# Patient Record
Sex: FEMALE | Race: WHITE | NOT HISPANIC OR LATINO | Employment: UNEMPLOYED | URBAN - METROPOLITAN AREA
[De-identification: names, ages, dates, MRNs, and addresses within clinical notes are randomized per-mention and may not be internally consistent; named-entity substitution may affect disease eponyms.]

---

## 2017-03-29 DIAGNOSIS — Z12.31 ENCOUNTER FOR SCREENING MAMMOGRAM FOR MALIGNANT NEOPLASM OF BREAST: ICD-10-CM

## 2017-03-30 ENCOUNTER — ALLSCRIPTS OFFICE VISIT (OUTPATIENT)
Dept: OTHER | Facility: OTHER | Age: 47
End: 2017-03-30

## 2018-01-13 VITALS — WEIGHT: 173.56 LBS | SYSTOLIC BLOOD PRESSURE: 110 MMHG | DIASTOLIC BLOOD PRESSURE: 84 MMHG | BODY MASS INDEX: 22.9 KG/M2

## 2018-01-13 NOTE — RESULT NOTES
Verified Results  ([facility]) PapIG, HPV, rfx 16/18 [id number] 12:00AM Amanda Fuchs   No  of containers  Rosella Goldmann 01 CYTYC Thin Prep Vial     Test Name Result Flag Reference   DIAGNOSIS: Comment     NEGATIVE FOR INTRAEPITHELIAL LESION AND MALIGNANCY  THE CYTOLOGY PROCESSING WAS PERFORMED AT THE [facility] LOCATED AT  [facility], [address] [place] [address], [address] [place] [id number]  Specimen adequacy: Comment     Satisfactory for evaluation  Endocervical and/or squamous metaplastic  cells (endocervical component) are present  Clinician provided ICD10: Comment     [id number]   Performed by: Odalis Lomeli, Cytotechnologist (ASCP)     Rosella Goldmann Note: Comment     The Pap smear is a screening test designed to aid in the detection of  premalignant and malignant conditions of the uterine cervix  It is not a  diagnostic procedure and should not be used as the sole means of detecting  cervical cancer  Both false-positive and false-negative reports do occur  Test Methodology: Comment     This liquid based ThinPrep(R) pap test was screened with the  use of an image guided system  HPV, high-risk Negative  Negative   This high-risk HPV test detects thirteen high-risk types  (16/18/31/33/35/39/45/51/52/56/58/59/68) without differentiation

## 2018-01-14 NOTE — RESULT NOTES
Verified Results  ([facility]) Genital Culture, Routine 21Jan2016 12:00AM Jaswinder Cruz     Test Name Result Flag Reference   Genital Culture, Routine Final report     Result 1 Comment     Routine genital sandee

## 2018-04-03 ENCOUNTER — ANNUAL EXAM (OUTPATIENT)
Dept: OBGYN CLINIC | Facility: MEDICAL CENTER | Age: 48
End: 2018-04-03

## 2018-04-03 VITALS — DIASTOLIC BLOOD PRESSURE: 88 MMHG | BODY MASS INDEX: 24.14 KG/M2 | WEIGHT: 183 LBS | SYSTOLIC BLOOD PRESSURE: 142 MMHG

## 2018-04-03 DIAGNOSIS — Z12.39 SCREENING FOR MALIGNANT NEOPLASM OF BREAST: ICD-10-CM

## 2018-04-03 DIAGNOSIS — Z01.419 ENCOUNTER FOR WELL WOMAN EXAM WITH ROUTINE GYNECOLOGICAL EXAM: Primary | ICD-10-CM

## 2018-04-03 PROCEDURE — 99396 PREV VISIT EST AGE 40-64: CPT | Performed by: OBSTETRICS & GYNECOLOGY

## 2018-04-03 NOTE — PROGRESS NOTES
ASSESSMENT & PLAN: Paula Alcaraz is a 41 y o  [id number] with normal gynecologic exam     1   Routine well woman exam done today  2  Pap and HPV:  The patient's [unfilled]  Pap and cotesting was not done today  Current ASCCP Guidelines reviewed  Done  - next due   3  Mammogram ordered  4  The following were reviewed in today's visit: breast self exam and mammography screening ordered      CC:  Annual Gynecologic Examination    HPI: Paula Alcaraz is a 41 y o  [id number] who presents for annual gynecologic examination  She has the following concerns:  No gyn complaints    Health Maintenance:    She wears her seatbelt routinely  She does not perform regular monthly self breast exams  She feels safe at home  Past Medical History:   Diagnosis Date    Varicella     had ds as child       Past Surgical History:   Procedure Laterality Date     SECTION      [place]  DELIVERY ONLY N/A 2016    Procedure:  SECTION () REPEAT;  Surgeon: Ada Correa MD;  Location: [facility];  Service: Obstetrics       Past OB/Gyn History:  OB History      Para Term  AB Living    5 4 4 0 1 4    SAB TAB Ectopic Multiple Live Births    0 0 1 0 4           Pt does not have menstrual issues     History of sexually transmitted infection: No   History of abnormal pap smears: Yes   Patient is currently sexually active  heterosexual  The current method of family planning is none  Family History   Problem Relation Age of Onset    Heart disease Mother     Thyroid disease Mother     Diabetes Father      Type 2, due to underlying condition with hypoglycemia with coma    Hypertension Father     Cancer Father     Amblyopia Daughter        Social History:  Social History     Social History    Marital status: /Civil Union     Spouse name: N/A    Number of children: N/A    Years of education: N/A     Occupational History    Not on file       Social History Main Topics  Smoking status: Never Smoker    Smokeless tobacco: Never Used    Alcohol use No    Drug use: No    Sexual activity: Yes     Partners: Male     Birth control/ protection: Condom Male     Other Topics Concern    Not on file     Social History Narrative    No narrative on file     Presently lives with family  Patient is   Patient is currently   No Known Allergies    Current Outpatient Prescriptions:     acetaminophen (TYLENOL) 325 mg tablet, Take 1 tablet (325 mg total) by mouth every 4 (four) hours as needed for headaches , Disp: 30 tablet, Rfl: 0    Review of Systems:    Review of Systems  Constitutional :no fever, feels well, no tiredness, no recent weight gain or loss  ENT: no ear ache, no loss of hearing, no nosebleeds or nasal discharge, no sore throat or hoarseness  Cardiovascular: no complaints of slow or fast heart beat, no chest pain, no palpitations, no leg claudication or lower extremity edema  Respiratory: no complaints of shortness of shortness of breath, no BATRES  Breasts:no complaints of breast pain, breast lump, or nipple discharge  Gastrointestinal: no complaints of abdominal pain, constipation, nausea, vomiting, or diarrhea or bloody stools  Genitourinary : no complaints of dysuria, incontinence, pelvic pain, no dysmenorrhea, vaginal discharge or abnormal vaginal bleeding and as noted in HPI    Integumentary: no complaints of skin rash or lesion, itching or dry skin  Neurological: no complaints of headache, no confusion, no numbness or tingling, no dizziness or fainting    Objective      /88   Wt 83 kg (183 lb)   LMP 03/28/2018   BMI 24 14 kg/m²     General:   appears stated age, cooperative, alert normal mood and affect   Neck: normal, supple,trachea midline, no masses   Heart: regular rate and rhythm, S1, S2 normal, no murmur, click, rub or gallop   Lungs: clear to auscultation bilaterally   Breasts: normal appearance, no masses or tenderness, Inspection negative, No nipple retraction or dimpling, No nipple discharge or bleeding, No axillary or supraclavicular adenopathy, Normal to palpation without dominant masses   Abdomen: soft, non-tender, without masses or organomegaly   Vulva: normal, normal female genitalia, Bartholin's, Urethra, [place] normal   Vagina: normal vagina, no discharge, exudate, lesion, or erythema   Urethra: normal   Cervix: Normal, no discharge     Uterus: normal size, contour, position, consistency, mobility, non-tender   Adnexa: no mass, fullness, tenderness

## 2019-04-10 ENCOUNTER — ANNUAL EXAM (OUTPATIENT)
Dept: OBGYN CLINIC | Facility: MEDICAL CENTER | Age: 49
End: 2019-04-10

## 2019-04-10 VITALS
DIASTOLIC BLOOD PRESSURE: 78 MMHG | WEIGHT: 188.5 LBS | BODY MASS INDEX: 25.53 KG/M2 | SYSTOLIC BLOOD PRESSURE: 112 MMHG | HEIGHT: 72 IN

## 2019-04-10 DIAGNOSIS — Z12.31 ENCOUNTER FOR SCREENING MAMMOGRAM FOR MALIGNANT NEOPLASM OF BREAST: ICD-10-CM

## 2019-04-10 DIAGNOSIS — Z01.419 ENCOUNTER FOR WELL WOMAN EXAM WITH ROUTINE GYNECOLOGICAL EXAM: Primary | ICD-10-CM

## 2019-04-10 PROCEDURE — G0124 SCREEN C/V THIN LAYER BY MD: HCPCS | Performed by: PATHOLOGY

## 2019-04-10 PROCEDURE — 99396 PREV VISIT EST AGE 40-64: CPT | Performed by: OBSTETRICS & GYNECOLOGY

## 2019-04-10 PROCEDURE — G0145 SCR C/V CYTO,THINLAYER,RESCR: HCPCS | Performed by: PATHOLOGY

## 2019-04-10 PROCEDURE — 87624 HPV HI-RISK TYP POOLED RSLT: CPT | Performed by: OBSTETRICS & GYNECOLOGY

## 2019-04-18 LAB
LAB AP GYN PRIMARY INTERPRETATION: ABNORMAL
Lab: ABNORMAL
PATH INTERP SPEC-IMP: ABNORMAL

## 2019-04-19 LAB
HPV HR 12 DNA CVX QL NAA+PROBE: NEGATIVE
HPV16 DNA CVX QL NAA+PROBE: NEGATIVE
HPV18 DNA CVX QL NAA+PROBE: NEGATIVE

## 2021-02-17 ENCOUNTER — OFFICE VISIT (OUTPATIENT)
Dept: URGENT CARE | Facility: CLINIC | Age: 51
End: 2021-02-17

## 2021-02-17 VITALS
SYSTOLIC BLOOD PRESSURE: 182 MMHG | OXYGEN SATURATION: 98 % | TEMPERATURE: 98.9 F | HEART RATE: 86 BPM | RESPIRATION RATE: 18 BRPM | DIASTOLIC BLOOD PRESSURE: 83 MMHG

## 2021-02-17 DIAGNOSIS — S91.311A LACERATION OF PLANTAR ASPECT OF RIGHT FOOT, INITIAL ENCOUNTER: Primary | ICD-10-CM

## 2021-02-17 PROCEDURE — 90471 IMMUNIZATION ADMIN: CPT | Performed by: PHYSICIAN ASSISTANT

## 2021-02-17 PROCEDURE — 90715 TDAP VACCINE 7 YRS/> IM: CPT

## 2021-02-17 PROCEDURE — G0381 LEV 2 HOSP TYPE B ED VISIT: HCPCS | Performed by: PHYSICIAN ASSISTANT

## 2021-02-17 PROCEDURE — 12001 RPR S/N/AX/GEN/TRNK 2.5CM/<: CPT | Performed by: PHYSICIAN ASSISTANT

## 2021-02-17 NOTE — PATIENT INSTRUCTIONS
1  Keep initial dressing on and dry for approx  24 hours  If bleeds through, add additional dressing over initial dressing; elevate wound site if able and apply cold compress to lessen bleeding  2   After approx  24 hours may remove initial dressing  If dressing is stuck to wound, you may moisten dressing and gently work it off  Avoid ripping dressing off  3   Avoid submersing wound in water (tub, sink, pool, ocean, lake)  May shower and get area wet after 24 hours  4  Gently wash area with plain water and small amount of soap  Do not use peroxide for cleansing  After cleaning, pat dry and apply small amount of topical antibiotic ointment over wound site and clean dressing  5  Change dressing daily (also change dressing if it becomes soiled or wet )    6  May leave wound open to air after 48 hours if not longer oozing and when relaxing at home  7  If you develop any pain, swelling at wound site, you may try to elevate site if possble and use cold compresses over area  If concerned about possible infection (see below), seek further medical evaluation  8  Call your PCP today or tomorrow (or as soon as possible) to make appoint to have sutures removed in 9-10 days  9  Watch for signs of infection which would include increased pain at wound site, swelling, redness and purulent drainage  Seek further evaluation as soon as possible

## 2021-02-17 NOTE — PROGRESS NOTES
[facility]    NAME: Nara Alanis is a 52 y o  female  : 1970    MRN: [id number]  DATE: 2021  TIME: 11:54 AM    Assessment and Plan   Laceration of plantar aspect of right foot, initial encounter [S91 311A]  1  Laceration of plantar aspect of right foot, initial encounter  TDAP VACCINE GREATER THAN OR EQUAL TO 8YO IM      nurse administered Tdap vaccine  Patient Instructions   Patient Instructions   1  Keep initial dressing on and dry for approx  24 hours  If bleeds through, add additional dressing over initial dressing; elevate wound site if able and apply cold compress to lessen bleeding  2   After approx  24 hours may remove initial dressing  If dressing is stuck to wound, you may moisten dressing and gently work it off  Avoid ripping dressing off  3   Avoid submersing wound in water (tub, sink, pool, ocean, lake)  May shower and get area wet after 24 hours  4  Gently wash area with plain water and small amount of soap  Do not use peroxide for cleansing  After cleaning, pat dry and apply small amount of topical antibiotic ointment over wound site and clean dressing  5  Change dressing daily (also change dressing if it becomes soiled or wet )    6  May leave wound open to air after 48 hours if not longer oozing and when relaxing at home  7  If you develop any pain, swelling at wound site, you may try to elevate site if possble and use cold compresses over area  If concerned about possible infection (see below), seek further medical evaluation  8  Call your PCP today or tomorrow (or as soon as possible) to make appoint to have sutures removed in 9-10 days  9  Watch for signs of infection which would include increased pain at wound site, swelling, redness and purulent drainage  Seek further evaluation as soon as possible        Chief Complaint     Chief Complaint   Patient presents with    Puncture Wound     Pt hit foot off of cat training board causing puncture to posterior right foot at base of 5th toe  History of Present Illness   Janna Allen presents to the clinic c/o   44-year-old female comes in with wound bottom of right foot just at the base of her right little toe  Caught her foot on a linoleum counter top that was down on the ground and sustained wound  Last tetanus was about   5 years ago  Review of Systems   Review of Systems   Constitutional: Negative  Skin: Positive for wound  Current Medications     No long-term medications on file  Current Allergies     Allergies as of 2021    (No Known Allergies)          The following portions of the patient's history were reviewed and updated as appropriate: allergies, current medications, past family history, past medical history, past social history, past surgical history and problem list   Past Medical History:   Diagnosis Date    Abnormal Pap smear of cervix     Varicella     had ds as child     Past Surgical History:   Procedure Laterality Date     SECTION      [place]  DELIVERY ONLY N/A 2016    Procedure:  SECTION () REPEAT;  Surgeon: Erica Luna MD;  Location: [facility];  Service: Obstetrics     Family History   Problem Relation Age of Onset    Heart disease Mother     Thyroid disease Mother     Diabetes Father         Type 2, due to underlying condition with hypoglycemia with coma    Hypertension Father     Cancer Father     Amblyopia Daughter        Objective   BP (!) 182/83   Pulse 86   Temp 98 9 °F (37 2 °C)   Resp 18   LMP 2021   SpO2 98%   Patient's last menstrual period was 2021  doc       Physical Exam     Physical Exam  Vitals signs and nursing note reviewed  Constitutional:       General: She is not in acute distress  Appearance: Normal appearance  She is well-developed  She is not ill-appearing, toxic-appearing or diaphoretic  Comments: Pleasant     Cardiovascular:      Rate and Rhythm: Normal rate  Pulmonary:      Effort: Pulmonary effort is normal  No respiratory distress  Skin:     General: Skin is warm and dry  Comments: Wound bottom of right foot just proximal to right little toe  Neurological:      Mental Status: She is alert and oriented to person, place, and time  Psychiatric:         Mood and Affect: Mood normal          Behavior: Behavior normal        Laceration repair    Date/Time: 2/17/2021 11:36 AM  Performed by: Maryanne Cohen PA-C  Authorized by: Maryanne Cohen PA-C   Consent: Verbal consent obtained  Consent given by: patient  Patient understanding: patient states understanding of the procedure being performed  Patient identity confirmed: verbally with patient  Body area: lower extremity  Location details: right foot  Laceration length: 1 cm  Foreign bodies: no foreign bodies  Tendon involvement: none  Nerve involvement: none  Vascular damage: no  Anesthesia: local infiltration    Anesthesia:  Local Anesthetic: lidocaine 1% without epinephrine  Anesthetic total: 2 mL    Sedation:  Patient sedated: no      Wound Dehiscence:  Superficial Wound Dehiscence: simple closure      Procedure Details:  Preparation: Patient was prepped and draped in the usual sterile fashion    Irrigation solution: saline  Irrigation method: jet lavage  Amount of cleaning: standard  Debridement: none  Degree of undermining: none  Skin closure: 4-0 nylon  Number of sutures: 2  Technique: simple  Approximation: close  Approximation difficulty: simple  Dressing: 4x4 sterile gauze, antibiotic ointment and pressure dressing  Patient tolerance: patient tolerated the procedure well with no immediate complications

## 2021-02-25 ENCOUNTER — OFFICE VISIT (OUTPATIENT)
Dept: URGENT CARE | Facility: CLINIC | Age: 51
End: 2021-02-25

## 2021-02-25 VITALS
RESPIRATION RATE: 20 BRPM | TEMPERATURE: 97.1 F | HEART RATE: 72 BPM | SYSTOLIC BLOOD PRESSURE: 140 MMHG | OXYGEN SATURATION: 98 % | DIASTOLIC BLOOD PRESSURE: 84 MMHG

## 2021-02-25 DIAGNOSIS — Z48.02 ENCOUNTER FOR REMOVAL OF SUTURES: Primary | ICD-10-CM

## 2021-02-25 PROCEDURE — G0382 LEV 3 HOSP TYPE B ED VISIT: HCPCS | Performed by: NURSE PRACTITIONER

## 2021-02-25 NOTE — PROGRESS NOTES
[facility]        NAME: Vera Dodd is a 52 y o  female  : 1970    MRN: [id number]  DATE: 2021  TIME: 1:29 PM    Assessment and Plan   Encounter for removal of sutures [Z48 02]  1  Encounter for removal of sutures       See procedure note   Pt tolerated well   F/u prn    Patient Instructions     Follow up with PCP in 3-5 days  Proceed to  ER if symptoms worsen  Chief Complaint     Chief Complaint   Patient presents with    Suture / Staple Removal     Here for suture removal         History of Present Illness   Vera Dodd presents to the clinic c/o    Pt was seen  and had 2 sutures placed on bottom of right foot at base of 5th toe  Healing well  Denies any pain, redness, or drainage from area  Review of Systems   Review of Systems   All other systems reviewed and are negative  Current Medications     No long-term medications on file  Current Allergies     Allergies as of 2021    (No Known Allergies)            The following portions of the patient's history were reviewed and updated as appropriate: allergies, current medications, past family history, past medical history, past social history, past surgical history and problem list     Objective   /84   Pulse 72   Temp (!) 97 1 °F (36 2 °C) (Tympanic)   Resp 20   SpO2 98%          Physical Exam     Physical Exam  Vitals signs and nursing note reviewed  Constitutional:       Appearance: She is well-developed  HENT:      Head: Normocephalic and atraumatic  Eyes:      General: Lids are normal       Conjunctiva/sclera: Conjunctivae normal    Cardiovascular:      Rate and Rhythm: Normal rate and regular rhythm  Heart sounds: Normal heart sounds, S1 normal and S2 normal    Pulmonary:      Effort: Pulmonary effort is normal       Breath sounds: Normal breath sounds  Musculoskeletal:        Feet:    Skin:     General: Skin is warm and dry     Neurological:      Mental Status: She is alert and oriented to person, place, and time  Psychiatric:         Speech: Speech normal          Behavior: Behavior normal  Behavior is cooperative  Thought Content: Thought content normal          Judgment: Judgment normal        Suture removal    Date/Time: 2/25/2021 1:27 PM  Performed by: NASEEM Boyle  Authorized by: NASEEM Boyle   Universal Protocol:  Consent: Verbal consent obtained  Risks and benefits: risks, benefits and alternatives were discussed  Consent given by: patient  Time out: Immediately prior to procedure a "time out" was called to verify the correct patient, procedure, equipment, support staff and site/side marked as required  Timeout called at: 2/25/2021 1:28 PM   Patient understanding: patient states understanding of the procedure being performed  Patient consent: the patient's understanding of the procedure matches consent given  Procedure consent: procedure consent matches procedure scheduled  Relevant documents: relevant documents present and verified  Test results: test results available and properly labeled  Site marked: the operative site was marked  Radiology Images displayed and confirmed  If images not available, report reviewed: imaging studies available  Required items: required blood products, implants, devices, and special equipment available  Patient identity confirmed: verbally with patient        Patient location:  Clinic  Location:     Laterality:  Right    Location:  Lower extremity    Lower extremity location:  Foot    Foot location:  R foot  Procedure details: Tools used:  Suture removal kit    Wound appearance:  No sign(s) of infection, good wound healing, clean and nontender    Number of sutures removed:  2  Post-procedure details:     Post-removal:  Antibiotic ointment applied    Patient tolerance of procedure:   Tolerated well, no immediate complications

## 2021-03-08 ENCOUNTER — ANNUAL EXAM (OUTPATIENT)
Dept: OBGYN CLINIC | Facility: MEDICAL CENTER | Age: 51
End: 2021-03-08

## 2021-03-08 VITALS — SYSTOLIC BLOOD PRESSURE: 130 MMHG | WEIGHT: 189.7 LBS | DIASTOLIC BLOOD PRESSURE: 80 MMHG | BODY MASS INDEX: 25.03 KG/M2

## 2021-03-08 DIAGNOSIS — Z12.31 ENCOUNTER FOR SCREENING MAMMOGRAM FOR MALIGNANT NEOPLASM OF BREAST: ICD-10-CM

## 2021-03-08 DIAGNOSIS — Z01.419 ENCOUNTER FOR WELL WOMAN EXAM WITH ROUTINE GYNECOLOGICAL EXAM: Primary | ICD-10-CM

## 2021-03-08 PROCEDURE — 99396 PREV VISIT EST AGE 40-64: CPT | Performed by: OBSTETRICS & GYNECOLOGY

## 2021-03-08 NOTE — PROGRESS NOTES
ASSESSMENT & PLAN: Cyrus Nicole is a 52 y o  [id number] with normal gynecologic exam     1   Routine well woman exam done today  2  Pap and HPV:  The patient's last pap and hpv was   It was ASCUS, HPV negative  Pap and cotesting was not done today  Due   Current ASCCP Guidelines reviewed  Per patient's request  Had prior abnormal pap  3   Mammogram ordered  4  The following were reviewed in today's visit: breast self exam and mammography screening ordered      CC:  Annual Gynecologic Examination    HPI: Cyrus Nicole is a 52 y o  [id number] who presents for annual gynecologic examination  She has the following concerns:  No gyn complaints, doing well; cycles regular every 28-32 days    Health Maintenance:    She wears her seatbelt routinely  She does perform regular monthly self breast exams  She feels safe at home  Patient Active Problem List   Diagnosis    Status post repeat low transverse  section       Past Medical History:   Diagnosis Date    Abnormal Pap smear of cervix     Varicella     had ds as child       Past Surgical History:   Procedure Laterality Date     SECTION      [place]  DELIVERY ONLY N/A 2016    Procedure:  SECTION () REPEAT;  Surgeon: Javon Gottlieb MD;  Location: [facility];  Service: Obstetrics       Past OB/Gyn History:  OB History        5    Para   4    Term   4       0    AB   1    Living   4       SAB   0    TAB   0    Ectopic   1    Multiple   0    Live Births   4                  Pt does not have menstrual issues     History of sexually transmitted infection: No   History of abnormal pap smears: No      Patient is currently sexually active  heterosexual  The current method of family planning is none      Family History   Problem Relation Age of Onset    Heart disease Mother     Thyroid disease Mother     Diabetes Father         Type 2, due to underlying condition with hypoglycemia with coma    Hypertension Father     Cancer Father     Amblyopia Daughter        Social History:  Social History     Socioeconomic History    Marital status: /Civil Union     Spouse name: Not on file    Number of children: Not on file    Years of education: Not on file    Highest education level: Not on file   Occupational History    Not on file   Social Needs    Financial resource strain: Not on file    Food insecurity     Worry: Not on file     Inability: Not on file   [employer] needs     Medical: Not on file     Non-medical: Not on file   Tobacco Use    Smoking status: Never Smoker    Smokeless tobacco: Never Used   Substance and Sexual Activity    Alcohol use: No    Drug use: No    Sexual activity: Yes     Partners: Male     Birth control/protection: Condom Male   Lifestyle    Physical activity     Days per week: Not on file     Minutes per session: Not on file    Stress: Not on file   Relationships    Social connections     Talks on phone: Not on file     Gets together: Not on file     Attends Rastafarian service: Not on file     Active member of club or organization: Not on file     Attends meetings of clubs or organizations: Not on file     Relationship status: Not on file    Intimate partner violence     Fear of current or ex partner: Not on file     Emotionally abused: Not on file     Physically abused: Not on file     Forced sexual activity: Not on file   Other Topics Concern    Not on file   Social History Narrative    Not on file     No Known Allergies    Current Outpatient Medications:     acetaminophen (TYLENOL) 325 mg tablet, Take 1 tablet (325 mg total) by mouth every 4 (four) hours as needed for headaches , Disp: 30 tablet, Rfl: 0    Review of Systems:    Review of Systems  Constitutional :no fever, feels well, no tiredness, no recent weight gain or loss  ENT: no ear ache, no loss of hearing, no nosebleeds or nasal discharge, no sore throat or hoarseness    Cardiovascular: no complaints of slow or fast heart beat, no chest pain, no palpitations, no leg claudication or lower extremity edema  Respiratory: no complaints of shortness of shortness of breath, no BATRES  Breasts:no complaints of breast pain, breast lump, or nipple discharge  Gastrointestinal: no complaints of abdominal pain, constipation, nausea, vomiting, or diarrhea or bloody stools  Genitourinary : no complaints of dysuria, incontinence, pelvic pain, no dysmenorrhea, vaginal discharge or abnormal vaginal bleeding and as noted in HPI  Musculoskeletal: no complaints of arthralgia, no myalgia, no joint swelling or stiffness, no limb pain or swelling    Integumentary: no complaints of skin rash or lesion, itching or dry skin  Neurological: no complaints of headache, no confusion, no numbness or tingling, no dizziness or fainting    Objective      /80   Wt 86 kg (189 lb 11 2 oz)   LMP 02/26/2021   BMI 25 03 kg/m²     General:   appears stated age, cooperative, alert normal mood and affect   Neck: normal, supple,trachea midline, no masses   Heart: regular rate and rhythm, S1, S2 normal, no murmur, click, rub or gallop   Lungs: clear to auscultation bilaterally   Breasts: normal appearance, no masses or tenderness, Inspection negative, No nipple retraction or dimpling, No nipple discharge or bleeding, No axillary or supraclavicular adenopathy, Normal to palpation without dominant masses   Abdomen: soft, non-tender, without masses or organomegaly   Vulva: normal female genitalia, Bartholin's, Urethra, [place] normal, no lesions, normal female hair distribution   Vagina: normal vagina, no discharge, exudate, lesion, or erythema   Urethra: normal   Cervix: Normal, no discharge   Uterus: normal size, contour, position, consistency, mobility, non-tender   Adnexa: no mass, fullness, tenderness   Lymphatic palpation of lymph nodes in neck, axilla, groin and/or other locations: no lymphadenopathy or masses noted   Skin normal skin turgor and no rashes     Psychiatric orientation to person, place, and time: normal  mood and affect: normal

## 2022-03-14 ENCOUNTER — ANNUAL EXAM (OUTPATIENT)
Dept: OBGYN CLINIC | Facility: MEDICAL CENTER | Age: 52
End: 2022-03-14

## 2022-03-14 VITALS
DIASTOLIC BLOOD PRESSURE: 74 MMHG | BODY MASS INDEX: 26.37 KG/M2 | SYSTOLIC BLOOD PRESSURE: 132 MMHG | HEIGHT: 72 IN | HEART RATE: 111 BPM | WEIGHT: 194.7 LBS

## 2022-03-14 DIAGNOSIS — Z12.31 ENCOUNTER FOR SCREENING MAMMOGRAM FOR MALIGNANT NEOPLASM OF BREAST: ICD-10-CM

## 2022-03-14 DIAGNOSIS — Z01.419 ENCOUNTER FOR WELL WOMAN EXAM WITH ROUTINE GYNECOLOGICAL EXAM: Primary | ICD-10-CM

## 2022-03-14 DIAGNOSIS — Z12.4 SCREENING FOR CERVICAL CANCER: ICD-10-CM

## 2022-03-14 PROCEDURE — G0476 HPV COMBO ASSAY CA SCREEN: HCPCS | Performed by: OBSTETRICS & GYNECOLOGY

## 2022-03-14 PROCEDURE — G0145 SCR C/V CYTO,THINLAYER,RESCR: HCPCS | Performed by: OBSTETRICS & GYNECOLOGY

## 2022-03-14 PROCEDURE — 99396 PREV VISIT EST AGE 40-64: CPT | Performed by: OBSTETRICS & GYNECOLOGY

## 2022-03-14 NOTE — PATIENT INSTRUCTIONS
Mammogram   WHAT YOU SHOULD KNOW:   A mammogram is an x-ray of your breasts to screen for breast cancer  INSTRUCTIONS:   Who should have a mammogram:  You should have a mammogram if you felt a lump or noticed other changes during a breast self-exam  Ask your primary healthcare provider when you should start having mammograms  How to get ready for a mammogram:   · Do not use deodorant, powder, lotion, or perfume  These products may cause particles to appear on your mammogram     · Wear a 2-piece outfit  · If you are breastfeeding, express as much milk as possible before the mammogram     · Bring a list of the dates and places of your past mammograms and other breast tests or treatments  · If your breasts are tender before your monthly period, do not have a mammogram during this time  Schedule your mammogram to be done 1 week after your period ends  How a mammogram is done:  A top view and a side view x-ray are usually done for each breast  Tell caregivers if you have breast implants or breast problems before you have your mammogram  You may need extra x-rays of each breast   · You will be given a hospital gown  Take off your clothes from the waist up  Wear the hospital gown so that it opens in the front  · You will sit or stand next to a small x-ray machine  Caregivers will help you place one of your breasts on the x-ray plate  Your arm and breast will be moved until your breast is in the correct position  · Your breast will be gently pressed between 2 plastic plates for a few seconds while the x-ray is taken  This may be uncomfortable  · You will be asked to hold your breath while the x-ray is taken  Another x-ray will be taken of the same breast after the position of the x-ray machine has been changed  · Your other breast will be x-rayed the same way  After your mammogram:  Your breasts may feel tender for a short while after the mammogram  You may resume your regular activities   Ask your primary healthcare provider when you should receive the results of your mammogram   Risks of a mammogram:  You will be exposed to a small amount of radiation  Some breast cancers may not show up on mammograms  Follow up with your healthcare provider as directed:  Write down your questions so you remember to ask them during your visits  Contact your primary healthcare provider if:   · You cannot make your appointment on time  · You do not receive your results when expected  · You have questions or concerns about the mammogram   © 2014 [employer] is for End User's use only and may not be sold, redistributed or otherwise used for commercial purposes  All illustrations and images included in CareNotes® are the copyrighted property of A D [employer]  or David Hartley  The above information is an  only  It is not intended as medical advice for individual conditions or treatments  Talk to your doctor, nurse or pharmacist before following any medical regimen to see if it is safe and effective for you

## 2022-03-14 NOTE — PROGRESS NOTES
ASSESSMENT & PLAN: Vannessa Vargas is a 54 y o  [id number] with normal gynecologic exam     1   Routine well woman exam done today  2  Pap and HPV:  The patient's last pap and hpv was   It was ASCUS, HPV negative, repeat pap done today   3  Mammogram ordered, patient encouraged to complete at earliest convenience  4  The following were reviewed in today's visit: breast self exam and mammography screening ordered      CC:  Annual Gynecologic Examination    HPI: Vannessa Vargas is a 54 y o  [id number] who presents for annual gynecologic examination  She has the following concerns:  She reports that she missed one period in January but is otherwise still getting her periods  She has noted a change in the flow of her periods, sometimes it is heavy, other times it is light  Health Maintenance:    She wears her seatbelt routinely  She does perform regular monthly self breast exams  She feels safe at home  Patient Active Problem List   Diagnosis    Status post repeat low transverse  section       Past Medical History:   Diagnosis Date    Abnormal Pap smear of cervix     Varicella     had ds as child       Past Surgical History:   Procedure Laterality Date     SECTION      [place]  DELIVERY ONLY N/A 2016    Procedure:  SECTION () REPEAT;  Surgeon: Krunal Aden MD;  Location: [facility];  Service: Obstetrics       Past OB/Gyn History:  OB History        5    Para   4    Term   4       0    AB   1    Living   4       SAB   0    IAB   0    Ectopic   1    Multiple   0    Live Births   4                  Pt does not have menstrual issues     History of sexually transmitted infection: No   History of abnormal pap smears: No      Patient is currently sexually active  heterosexual  The current method of family planning is none      Family History   Problem Relation Age of Onset    Heart disease Mother     Thyroid disease Mother     Diabetes Father Type 2, due to underlying condition with hypoglycemia with coma    Hypertension Father     Cancer Father     Amblyopia Daughter        Social History:  Social History     Socioeconomic History    Marital status: /Civil Union     Spouse name: Not on file    Number of children: Not on file    Years of education: Not on file    Highest education level: Not on file   Occupational History    Not on file   Tobacco Use    Smoking status: Never Smoker    Smokeless tobacco: Never Used   Vaping Use    Vaping Use: Never used   Substance and Sexual Activity    Alcohol use: No    Drug use: No    Sexual activity: Yes     Partners: Male     Birth control/protection: Condom Male   Other Topics Concern    Not on file   Social History Narrative    Not on file     Social Determinants of Health     Financial Resource Strain: Not on file   Food Insecurity: Not on file   Transportation Needs: Not on file   Physical Activity: Not on file   Stress: Not on file   Social Connections: Not on file   Intimate Partner Violence: Not on file   Housing Stability: Not on file     No Known Allergies    Current Outpatient Medications:     acetaminophen (TYLENOL) 325 mg tablet, Take 1 tablet (325 mg total) by mouth every 4 (four) hours as needed for headaches , Disp: 30 tablet, Rfl: 0    Review of Systems:    Review of Systems  Constitutional :no fever, feels well, no tiredness, no recent weight gain or loss  ENT: no ear ache, no loss of hearing, no nosebleeds or nasal discharge, no sore throat or hoarseness  Cardiovascular: no complaints of slow or fast heart beat, no chest pain, no palpitations, no leg claudication or lower extremity edema    Respiratory: no complaints of shortness of shortness of breath, no BATRES  Breasts:no complaints of breast pain, breast lump, or nipple discharge  Gastrointestinal: no complaints of abdominal pain, constipation, nausea, vomiting, or diarrhea or bloody stools  Genitourinary : no complaints of dysuria, incontinence, pelvic pain, no dysmenorrhea, vaginal discharge or abnormal vaginal bleeding and as noted in HPI  Musculoskeletal: no complaints of arthralgia, no myalgia, no joint swelling or stiffness, no limb pain or swelling  Integumentary: no complaints of skin rash or lesion, itching or dry skin  Neurological: no complaints of headache, no confusion, no numbness or tingling, no dizziness or fainting    Objective      /74 (BP Location: Right arm, Patient Position: Sitting, Cuff Size: Standard)   Pulse (!) 111   Ht 6' 1" (1 854 m)   Wt 88 3 kg (194 lb 11 2 oz)   LMP 02/25/2022 (Approximate)   BMI 25 69 kg/m²     General:   appears stated age, cooperative, alert normal mood and affect   Neck: normal, supple,trachea midline, no masses   Breasts: normal appearance, no masses or tenderness, Inspection negative, No nipple retraction or dimpling, No nipple discharge or bleeding, No axillary or supraclavicular adenopathy, Normal to palpation without dominant masses   Vulva: normal female genitalia, Bartholin's, Urethra, [place] normal; normal female hair distribution; erythema/irritation noted posteriorly, no evidence of superimposed infection   Vagina: normal vagina, no discharge, exudate, lesion, or erythema   Urethra: normal   Cervix: Normal, no discharge   Uterus: normal size, contour, position, consistency, mobility, non-tender   Adnexa: no mass, fullness, tenderness   Lymphatic palpation of lymph nodes in neck, axilla, groin and/or other locations: no lymphadenopathy or masses noted   Skin normal skin turgor and no rashes     Psychiatric orientation to person, place, and time: normal  mood and affect: normal         Jonathon Ann MD, PGY-4  3/14/2022  2:30 PM

## 2022-03-18 LAB
LAB AP GYN PRIMARY INTERPRETATION: NORMAL
Lab: NORMAL

## 2022-08-25 ENCOUNTER — OFFICE VISIT (OUTPATIENT)
Dept: URGENT CARE | Facility: CLINIC | Age: 52
End: 2022-08-25

## 2022-08-25 VITALS
DIASTOLIC BLOOD PRESSURE: 76 MMHG | HEART RATE: 97 BPM | SYSTOLIC BLOOD PRESSURE: 120 MMHG | TEMPERATURE: 98.4 F | OXYGEN SATURATION: 99 % | RESPIRATION RATE: 18 BRPM

## 2022-08-25 DIAGNOSIS — J03.90 ACUTE TONSILLITIS, UNSPECIFIED ETIOLOGY: Primary | ICD-10-CM

## 2022-08-25 DIAGNOSIS — J06.9 BACTERIAL URI: ICD-10-CM

## 2022-08-25 DIAGNOSIS — B96.89 BACTERIAL URI: ICD-10-CM

## 2022-08-25 PROCEDURE — G0382 LEV 3 HOSP TYPE B ED VISIT: HCPCS | Performed by: NURSE PRACTITIONER

## 2022-08-25 RX ORDER — AMOXICILLIN 875 MG/1
875 TABLET, COATED ORAL 2 TIMES DAILY
Qty: 20 TABLET | Refills: 0 | Status: SHIPPED | OUTPATIENT
Start: 2022-08-25 | End: 2022-09-04

## 2022-08-25 NOTE — PROGRESS NOTES
[facility]        NAME: Janine Bowers is a 54 y o  female  : 1970    MRN: [id number]  DATE: 2022  TIME: 10:04 AM    Assessment and Plan   Acute tonsillitis, unspecified etiology [J03 90]  1  Acute tonsillitis, unspecified etiology  amoxicillin (AMOXIL) 875 mg tablet   2  Bacterial URI  amoxicillin (AMOXIL) 875 mg tablet   start course of amoxicillin   recommend salt water gargles   Continue otc medication as needed   Pt in agreement with plan of care  Patient Instructions     Follow up with PCP in 3-5 days  Proceed to  ER if symptoms worsen  Chief Complaint     Chief Complaint   Patient presents with    Cold Like Symptoms     Patient c/o facial pain with pressure, sore throat, and earaches x 1-2 weeks         History of Present Illness   Janine Bowers presents to the clinic c/o    Pt states Aug 6 started with sinus pain and pressure   Then progressed into a sore throat - pain with swallowing - pain into her ears  No fever, chills, body aches, or cough  Review of Systems   Review of Systems   All other systems reviewed and are negative  Current Medications     No long-term medications on file  Current Allergies     Allergies as of 2022    (No Known Allergies)            The following portions of the patient's history were reviewed and updated as appropriate: allergies, current medications, past family history, past medical history, past social history, past surgical history and problem list     Objective   /76   Pulse 97   Temp 98 4 °F (36 9 °C)   Resp 18   SpO2 99%        Physical Exam     Physical Exam  Vitals and nursing note reviewed  Constitutional:       Appearance: She is well-developed  HENT:      Head: Normocephalic and atraumatic        Right Ear: Tympanic membrane, ear canal and external ear normal       Left Ear: Tympanic membrane, ear canal and external ear normal       Nose: Nose normal       Mouth/Throat: Mouth: Mucous membranes are moist       Pharynx: Posterior oropharyngeal erythema present  No pharyngeal swelling, oropharyngeal exudate or uvula swelling  Tonsils: Tonsillar exudate present  No tonsillar abscesses  1+ on the right  1+ on the left  Eyes:      General: Lids are normal       Extraocular Movements: Extraocular movements intact  Conjunctiva/sclera: Conjunctivae normal       Pupils: Pupils are equal, round, and reactive to light  Cardiovascular:      Rate and Rhythm: Normal rate and regular rhythm  Heart sounds: Normal heart sounds, S1 normal and S2 normal    Pulmonary:      Effort: Pulmonary effort is normal       Breath sounds: Normal breath sounds  Musculoskeletal:      Cervical back: Normal range of motion and neck supple  Lymphadenopathy:      Cervical: Cervical adenopathy present  Skin:     General: Skin is warm and dry  Neurological:      Mental Status: She is alert and oriented to person, place, and time  Psychiatric:         Speech: Speech normal          Behavior: Behavior normal  Behavior is cooperative  Thought Content:  Thought content normal          Judgment: Judgment normal